# Patient Record
Sex: FEMALE | Race: WHITE | NOT HISPANIC OR LATINO | URBAN - METROPOLITAN AREA
[De-identification: names, ages, dates, MRNs, and addresses within clinical notes are randomized per-mention and may not be internally consistent; named-entity substitution may affect disease eponyms.]

---

## 2018-07-22 PROBLEM — 235856003 DISEASE OF LIVER: Status: ACTIVE | Noted: 2018-07-16

## 2018-11-08 PROBLEM — 197321007 FATTY LIVER: Status: ACTIVE | Noted: 2018-11-08

## 2019-03-27 PROBLEM — 16932000 NAUSEA AND VOMITING: Status: ACTIVE | Noted: 2019-03-27

## 2019-03-27 PROBLEM — 14760008: Status: ACTIVE | Noted: 2018-11-29

## 2019-04-04 PROBLEM — 10743008 IRRITABLE BOWEL SYNDROME: Status: ACTIVE | Noted: 2018-02-01

## 2019-04-04 PROBLEM — 57773001: Status: ACTIVE | Noted: 2018-11-30

## 2019-04-04 PROBLEM — 447072005: Status: ACTIVE | Noted: 2018-11-30

## 2019-04-04 PROBLEM — 721705006: Status: ACTIVE | Noted: 2019-04-04

## 2020-12-28 ENCOUNTER — OFFICE VISIT (OUTPATIENT)
Dept: URBAN - METROPOLITAN AREA CLINIC 23 | Facility: CLINIC | Age: 75
End: 2020-12-28
Payer: MEDICARE

## 2020-12-28 ENCOUNTER — WEB ENCOUNTER (OUTPATIENT)
Dept: URBAN - METROPOLITAN AREA CLINIC 23 | Facility: CLINIC | Age: 75
End: 2020-12-28

## 2020-12-28 VITALS
TEMPERATURE: 96.4 F | WEIGHT: 130 LBS | BODY MASS INDEX: 23.92 KG/M2 | HEART RATE: 74 BPM | DIASTOLIC BLOOD PRESSURE: 94 MMHG | HEIGHT: 62 IN | SYSTOLIC BLOOD PRESSURE: 147 MMHG

## 2020-12-28 DIAGNOSIS — K59.01 CONSTIPATION: ICD-10-CM

## 2020-12-28 DIAGNOSIS — Z12.11 SCREEN FOR COLON CANCER: ICD-10-CM

## 2020-12-28 DIAGNOSIS — K57.92 DIVERTICULITIS: ICD-10-CM

## 2020-12-28 DIAGNOSIS — K57.90 DIVERTICULOSIS: ICD-10-CM

## 2020-12-28 DIAGNOSIS — K58.9 IRRITABLE BOWEL SYNDROME: ICD-10-CM

## 2020-12-28 PROBLEM — 307496006 DIVERTICULITIS: Status: ACTIVE | Noted: 2020-12-28

## 2020-12-28 PROBLEM — 275978004 SCREENING FOR MALIGNANT NEOPLASM OF COLON: Status: ACTIVE | Noted: 2020-12-28

## 2020-12-28 PROCEDURE — G9903 PT SCRN TBCO ID AS NON USER: HCPCS | Performed by: INTERNAL MEDICINE

## 2020-12-28 PROCEDURE — 99203 OFFICE O/P NEW LOW 30 MIN: CPT | Performed by: INTERNAL MEDICINE

## 2020-12-28 PROCEDURE — G8427 DOCREV CUR MEDS BY ELIG CLIN: HCPCS | Performed by: INTERNAL MEDICINE

## 2020-12-28 PROCEDURE — 3017F COLORECTAL CA SCREEN DOC REV: CPT | Performed by: INTERNAL MEDICINE

## 2020-12-28 PROCEDURE — G8420 CALC BMI NORM PARAMETERS: HCPCS | Performed by: INTERNAL MEDICINE

## 2020-12-28 RX ORDER — LEVOTHYROXINE SODIUM 0.09 MG/1
TABLET ORAL
Qty: 0 | Refills: 0 | Status: ACTIVE | COMMUNITY
Start: 1900-01-01

## 2020-12-28 RX ORDER — CLONAZEPAM 0.5 MG/1
TABLET, ORALLY DISINTEGRATING ORAL
Qty: 0 | Refills: 0 | Status: ACTIVE | COMMUNITY
Start: 1900-01-01

## 2020-12-28 RX ORDER — BIFIDOBACTERIUM LONGUM 10MM CELL
CAPSULE ORAL
Qty: 0 | Refills: 0 | Status: ACTIVE | COMMUNITY
Start: 1900-01-01

## 2020-12-28 RX ORDER — FLUTICASONE PROPIONATE 50 UG/1
SPRAY, METERED NASAL
Qty: 0 | Refills: 0 | Status: ACTIVE | COMMUNITY
Start: 1900-01-01

## 2020-12-28 RX ORDER — CITALOPRAM HYDROBROMIDE 10 MG
TABLET ORAL
Qty: 0 | Refills: 0 | Status: ACTIVE | COMMUNITY
Start: 1900-01-01

## 2020-12-28 RX ORDER — ASPIRIN 81 MG/1
TABLET, COATED ORAL
Qty: 0 | Refills: 0 | Status: ACTIVE | COMMUNITY
Start: 1900-01-01

## 2020-12-28 RX ORDER — HYOSCYAMINE SULFATE 0.12 MG/1
TABLET ORAL
Qty: 0 | Refills: 0 | Status: ACTIVE | COMMUNITY
Start: 1900-01-01

## 2020-12-28 NOTE — HPI-TODAY'S VISIT:
75 yo female presenting to establish care -she was seen in 2015- had diverticulosis, had HP polyp.  she had an egd as well.   -she was having recurrent diverticulitis- was referred to see Dr. Lynn- had hemorrhoid surgery.  she had a colonoscopy 2 years ago- 5/2018- which had good prep, diverticulosis, and a diminutive polyp which was HP.  -she switched practices for a period of time to see Dr. Jackson and saw a third GI specialist.   she states that she did have an MRI -has tried linzess, amitiza.  currently using miralax daily - this gives her some amount of regularity -when she does get constipated she will have ongoing pain.  she states that sometimes she will have a lower abdominal pain and discomfort, feels 'like little ants are crawling around' -after some time she clearly states that there is no current issue she is seeking to address , she just wants to establish care 'so someone knows my medical history in case something bad happens' --states that she has only been treated once for diverticultius

## 2022-02-21 ENCOUNTER — OFFICE VISIT (OUTPATIENT)
Dept: URBAN - METROPOLITAN AREA CLINIC 33 | Facility: CLINIC | Age: 77
End: 2022-02-21
Payer: MEDICARE

## 2022-02-21 VITALS
DIASTOLIC BLOOD PRESSURE: 84 MMHG | HEART RATE: 74 BPM | SYSTOLIC BLOOD PRESSURE: 134 MMHG | OXYGEN SATURATION: 98 % | WEIGHT: 130 LBS | HEIGHT: 62 IN | BODY MASS INDEX: 23.92 KG/M2

## 2022-02-21 DIAGNOSIS — K59.01 CONSTIPATION: ICD-10-CM

## 2022-02-21 DIAGNOSIS — K57.90 DIVERTICULOSIS: ICD-10-CM

## 2022-02-21 PROCEDURE — 99214 OFFICE O/P EST MOD 30 MIN: CPT | Performed by: INTERNAL MEDICINE

## 2022-02-21 RX ORDER — ESCITALOPRAM OXALATE 20 MG/1
1 TABLET TABLET, FILM COATED ORAL ONCE A DAY
Status: ACTIVE | COMMUNITY

## 2022-02-21 RX ORDER — FLUTICASONE PROPIONATE 50 UG/1
SPRAY, METERED NASAL
Qty: 0 | Refills: 0 | Status: ACTIVE | COMMUNITY
Start: 1900-01-01

## 2022-02-21 RX ORDER — AMLODIPINE BESYLATE 2.5 MG
1 TABLET TABLET ORAL ONCE A DAY
Status: ACTIVE | COMMUNITY

## 2022-02-21 RX ORDER — LEVOTHYROXINE SODIUM 75 UG/1
1 TABLET IN THE MORNING ON AN EMPTY STOMACH TABLET ORAL ONCE A DAY
Refills: 0 | Status: ACTIVE | COMMUNITY
Start: 1900-01-01

## 2022-02-21 RX ORDER — CLONAZEPAM 0.25 MG/1
1 TABLET TABLET, ORALLY DISINTEGRATING ORAL ONCE A DAY
Refills: 0 | Status: ACTIVE | COMMUNITY
Start: 1900-01-01

## 2022-02-21 RX ORDER — DICYCLOMINE HYDROCHLORIDE 10 MG/1
1 CAPSULE CAPSULE ORAL THREE TIMES A DAY
Status: ACTIVE | COMMUNITY

## 2022-03-03 ENCOUNTER — TELEPHONE ENCOUNTER (OUTPATIENT)
Dept: URBAN - METROPOLITAN AREA CLINIC 36 | Facility: CLINIC | Age: 77
End: 2022-03-03

## 2022-03-03 RX ORDER — LINACLOTIDE 72 UG/1
1 CAPSULE AT LEAST 30 MINUTES BEFORE THE FIRST MEAL OF THE DAY ON AN EMPTY STOMACH CAPSULE, GELATIN COATED ORAL ONCE A DAY
Qty: 30 | Refills: 3 | OUTPATIENT
Start: 2022-03-03 | End: 2022-07-01

## 2022-05-23 ENCOUNTER — OFFICE VISIT (OUTPATIENT)
Dept: URBAN - METROPOLITAN AREA CLINIC 33 | Facility: CLINIC | Age: 77
End: 2022-05-23

## 2022-06-13 ENCOUNTER — OFFICE VISIT (OUTPATIENT)
Dept: URBAN - METROPOLITAN AREA CLINIC 33 | Facility: CLINIC | Age: 77
End: 2022-06-13
Payer: MEDICARE

## 2022-06-13 ENCOUNTER — LAB OUTSIDE AN ENCOUNTER (OUTPATIENT)
Dept: URBAN - METROPOLITAN AREA CLINIC 33 | Facility: CLINIC | Age: 77
End: 2022-06-13

## 2022-06-13 VITALS — BODY MASS INDEX: 23.37 KG/M2 | WEIGHT: 127 LBS | HEIGHT: 62 IN

## 2022-06-13 DIAGNOSIS — R11.2 NAUSEA AND VOMITING, UNSPECIFIED VOMITING TYPE: ICD-10-CM

## 2022-06-13 DIAGNOSIS — K57.30 DIVERTICULOSIS OF LARGE INTESTINE WITHOUT PERFORATION OR ABSCESS WITHOUT BLEEDING: ICD-10-CM

## 2022-06-13 DIAGNOSIS — K59.01 CONSTIPATION: ICD-10-CM

## 2022-06-13 DIAGNOSIS — R13.13 PHARYNGEAL DYSPHAGIA: ICD-10-CM

## 2022-06-13 DIAGNOSIS — K58.9 IRRITABLE BOWEL SYNDROME: ICD-10-CM

## 2022-06-13 PROBLEM — 14760008 CONSTIPATION: Status: ACTIVE | Noted: 2020-12-28

## 2022-06-13 PROBLEM — 10743008 IRRITABLE BOWEL SYNDROME: Status: ACTIVE | Noted: 2020-12-28

## 2022-06-13 PROCEDURE — 99214 OFFICE O/P EST MOD 30 MIN: CPT | Performed by: INTERNAL MEDICINE

## 2022-06-13 RX ORDER — CLONAZEPAM 0.25 MG/1
1 TABLET TABLET, ORALLY DISINTEGRATING ORAL ONCE A DAY
Refills: 0 | Status: ACTIVE | COMMUNITY
Start: 1900-01-01

## 2022-06-13 RX ORDER — LEVOTHYROXINE SODIUM 75 UG/1
1 TABLET IN THE MORNING ON AN EMPTY STOMACH TABLET ORAL ONCE A DAY
Refills: 0 | Status: ACTIVE | COMMUNITY
Start: 1900-01-01

## 2022-06-13 RX ORDER — FLUTICASONE PROPIONATE 50 UG/1
SPRAY, METERED NASAL
Qty: 0 | Refills: 0 | Status: ACTIVE | COMMUNITY
Start: 1900-01-01

## 2022-06-13 RX ORDER — DICYCLOMINE HYDROCHLORIDE 10 MG/1
1 CAPSULE CAPSULE ORAL THREE TIMES A DAY
Status: ACTIVE | COMMUNITY

## 2022-06-13 RX ORDER — ESCITALOPRAM OXALATE 20 MG/1
1 TABLET TABLET, FILM COATED ORAL ONCE A DAY
Status: ACTIVE | COMMUNITY

## 2022-06-13 RX ORDER — LINACLOTIDE 72 UG/1
1 CAPSULE AT LEAST 30 MINUTES BEFORE THE FIRST MEAL OF THE DAY ON AN EMPTY STOMACH CAPSULE, GELATIN COATED ORAL ONCE A DAY
Qty: 30 | Refills: 3 | Status: ACTIVE | COMMUNITY
Start: 2022-03-03 | End: 2022-07-01

## 2022-06-13 RX ORDER — AMLODIPINE BESYLATE 2.5 MG
1 TABLET TABLET ORAL ONCE A DAY
Status: ACTIVE | COMMUNITY

## 2022-06-13 NOTE — HPI-TODAY'S VISIT:
Patient presents for follow up. Pt states she tried Linzess for approximatley 2 weeks,which made her sick so she stopped . She is still having severe constipation . Patient states she has recently been having abdominal pain when trying to pass stool and it causea nausea and vomiting . She complains of symptoms ruing her Qualityof life .     Of last visit (02/21/22) Patient presents for IBS . She states she has long hx of IBS. She admits having constipation and rectal pain. She also admits abdominal pain when she needs to move bowels . She admits 2 bowel movements a day with slow transit and she sits in bathroom for 45 min at a time . She states she has hx of diverticulosis and the diet is conflicting with her constipation diet . She admits her last colonoscopy was completed 10/2021 by DR Matilde Lynn with significant diverticulosis and hyperplastic polyp .

## 2022-06-22 ENCOUNTER — TELEPHONE ENCOUNTER (OUTPATIENT)
Dept: URBAN - METROPOLITAN AREA CLINIC 36 | Facility: CLINIC | Age: 77
End: 2022-06-22

## 2022-07-06 ENCOUNTER — TELEPHONE ENCOUNTER (OUTPATIENT)
Dept: URBAN - METROPOLITAN AREA CLINIC 36 | Facility: CLINIC | Age: 77
End: 2022-07-06

## 2022-07-06 ENCOUNTER — OFFICE VISIT (OUTPATIENT)
Dept: URBAN - METROPOLITAN AREA CLINIC 33 | Facility: CLINIC | Age: 77
End: 2022-07-06

## 2022-07-06 RX ORDER — FLUTICASONE PROPIONATE 50 UG/1
SPRAY, METERED NASAL
Qty: 0 | Refills: 0 | Status: ACTIVE | COMMUNITY
Start: 1900-01-01

## 2022-07-06 RX ORDER — DICYCLOMINE HYDROCHLORIDE 10 MG/1
1 CAPSULE CAPSULE ORAL THREE TIMES A DAY
Status: ACTIVE | COMMUNITY

## 2022-07-06 RX ORDER — LEVOTHYROXINE SODIUM 75 UG/1
1 TABLET IN THE MORNING ON AN EMPTY STOMACH TABLET ORAL ONCE A DAY
Refills: 0 | Status: ACTIVE | COMMUNITY
Start: 1900-01-01

## 2022-07-06 RX ORDER — ESCITALOPRAM OXALATE 20 MG/1
1 TABLET TABLET, FILM COATED ORAL ONCE A DAY
Status: ACTIVE | COMMUNITY

## 2022-07-06 RX ORDER — AMLODIPINE BESYLATE 2.5 MG
1 TABLET TABLET ORAL ONCE A DAY
Status: ACTIVE | COMMUNITY

## 2022-07-06 RX ORDER — CLONAZEPAM 0.25 MG/1
1 TABLET TABLET, ORALLY DISINTEGRATING ORAL ONCE A DAY
Refills: 0 | Status: ACTIVE | COMMUNITY
Start: 1900-01-01

## 2022-07-20 ENCOUNTER — OFFICE VISIT (OUTPATIENT)
Dept: URBAN - METROPOLITAN AREA CLINIC 33 | Facility: CLINIC | Age: 77
End: 2022-07-20
Payer: MEDICARE

## 2022-07-20 ENCOUNTER — LAB OUTSIDE AN ENCOUNTER (OUTPATIENT)
Dept: URBAN - METROPOLITAN AREA CLINIC 33 | Facility: CLINIC | Age: 77
End: 2022-07-20

## 2022-07-20 VITALS
HEIGHT: 62 IN | HEART RATE: 74 BPM | DIASTOLIC BLOOD PRESSURE: 84 MMHG | OXYGEN SATURATION: 98 % | SYSTOLIC BLOOD PRESSURE: 132 MMHG | BODY MASS INDEX: 23.37 KG/M2 | WEIGHT: 127 LBS

## 2022-07-20 DIAGNOSIS — R13.13 PHARYNGEAL DYSPHAGIA: ICD-10-CM

## 2022-07-20 DIAGNOSIS — R11.2 NAUSEA AND VOMITING, UNSPECIFIED VOMITING TYPE: ICD-10-CM

## 2022-07-20 DIAGNOSIS — R16.0 HEPATOMEGALY: ICD-10-CM

## 2022-07-20 DIAGNOSIS — K58.9 IBS (IRRITABLE BOWEL SYNDROME): ICD-10-CM

## 2022-07-20 DIAGNOSIS — K57.90 DIVERTICULOSIS: ICD-10-CM

## 2022-07-20 PROBLEM — 16932000: Status: ACTIVE | Noted: 2022-06-13

## 2022-07-20 PROBLEM — 21101000119105: Status: ACTIVE | Noted: 2022-06-13

## 2022-07-20 PROCEDURE — 99213 OFFICE O/P EST LOW 20 MIN: CPT | Performed by: INTERNAL MEDICINE

## 2022-07-20 RX ORDER — DICYCLOMINE HYDROCHLORIDE 10 MG/1
1 CAPSULE CAPSULE ORAL THREE TIMES A DAY
Status: ACTIVE | COMMUNITY

## 2022-07-20 RX ORDER — ESCITALOPRAM OXALATE 20 MG/1
1 TABLET TABLET, FILM COATED ORAL ONCE A DAY
Status: ACTIVE | COMMUNITY

## 2022-07-20 RX ORDER — FLUTICASONE PROPIONATE 50 UG/1
SPRAY, METERED NASAL
Qty: 0 | Refills: 0 | Status: ACTIVE | COMMUNITY
Start: 1900-01-01

## 2022-07-20 RX ORDER — LEVOTHYROXINE SODIUM 75 UG/1
1 TABLET IN THE MORNING ON AN EMPTY STOMACH TABLET ORAL ONCE A DAY
Refills: 0 | Status: ACTIVE | COMMUNITY
Start: 1900-01-01

## 2022-07-20 RX ORDER — AMLODIPINE BESYLATE 2.5 MG
1 TABLET TABLET ORAL ONCE A DAY
Status: ACTIVE | COMMUNITY

## 2022-07-20 RX ORDER — CLONAZEPAM 0.25 MG/1
1 TABLET TABLET, ORALLY DISINTEGRATING ORAL ONCE A DAY
Refills: 0 | Status: ACTIVE | COMMUNITY
Start: 1900-01-01

## 2022-07-20 NOTE — HPI-TODAY'S VISIT:
Patient presents for follow up . She had abdominal xray which resulted normal . She admits having THD hemorrhoid surgery wikth Dr Lynn at the end of June. Patient admits taking miralax but was recommended by surgeon to take citrucel . She admit she is still having hard time passing stool .    Of last visit (06/13/2022) Patient presents for follow up. Pt states she tried Linzess for approximatley 2 weeks,which made her sick so she stopped . She is still having severe constipation . Patient states she has recently been having abdominal pain when trying to pass stool and it causea nausea and vomiting . She complains of symptoms ruing her Qualityof life .     Of last visit (02/21/22) Patient presents for IBS . She states she has long hx of IBS. She admits having constipation and rectal pain. She also admits abdominal pain when she needs to move bowels . She admits 2 bowel movements a day with slow transit and she sits in bathroom for 45 min at a time . She states she has hx of diverticulosis and the diet is conflicting with her constipation diet . She admits her last colonoscopy was completed 10/2021 by DR Matilde Lynn with significant diverticulosis and hyperplastic polyp .

## 2022-07-27 ENCOUNTER — TELEPHONE ENCOUNTER (OUTPATIENT)
Dept: URBAN - METROPOLITAN AREA CLINIC 36 | Facility: CLINIC | Age: 77
End: 2022-07-27

## 2022-08-03 ENCOUNTER — OFFICE VISIT (OUTPATIENT)
Dept: URBAN - METROPOLITAN AREA CLINIC 33 | Facility: CLINIC | Age: 77
End: 2022-08-03
Payer: MEDICARE

## 2022-08-03 ENCOUNTER — DASHBOARD ENCOUNTERS (OUTPATIENT)
Age: 77
End: 2022-08-03

## 2022-08-03 VITALS — HEIGHT: 62 IN | WEIGHT: 126 LBS | BODY MASS INDEX: 23.19 KG/M2

## 2022-08-03 DIAGNOSIS — K82.4 GALLBLADDER POLYP: ICD-10-CM

## 2022-08-03 DIAGNOSIS — K76.0 FATTY LIVER: ICD-10-CM

## 2022-08-03 DIAGNOSIS — K57.30 DIVERTICULOSIS OF LARGE INTESTINE WITHOUT PERFORATION OR ABSCESS WITHOUT BLEEDING: ICD-10-CM

## 2022-08-03 DIAGNOSIS — K57.90 DIVERTICULOSIS: ICD-10-CM

## 2022-08-03 DIAGNOSIS — R16.0 HEPATOMEGALY: ICD-10-CM

## 2022-08-03 DIAGNOSIS — K58.9 IBS (IRRITABLE BOWEL SYNDROME): ICD-10-CM

## 2022-08-03 PROBLEM — 197321007: Status: ACTIVE | Noted: 2022-08-03

## 2022-08-03 PROBLEM — 197433003: Status: ACTIVE | Noted: 2022-08-03

## 2022-08-03 PROBLEM — 80515008: Status: ACTIVE | Noted: 2022-07-20

## 2022-08-03 PROBLEM — 398050005 DIVERTICULAR DISEASE OF COLON: Status: ACTIVE | Noted: 2018-02-01

## 2022-08-03 PROCEDURE — 99213 OFFICE O/P EST LOW 20 MIN: CPT | Performed by: INTERNAL MEDICINE

## 2022-08-03 RX ORDER — ESCITALOPRAM OXALATE 20 MG/1
1 TABLET TABLET, FILM COATED ORAL ONCE A DAY
Status: ACTIVE | COMMUNITY

## 2022-08-03 RX ORDER — DICYCLOMINE HYDROCHLORIDE 10 MG/1
1 CAPSULE CAPSULE ORAL THREE TIMES A DAY
Status: ACTIVE | COMMUNITY

## 2022-08-03 RX ORDER — AMLODIPINE BESYLATE 2.5 MG
1 TABLET TABLET ORAL ONCE A DAY
Status: ACTIVE | COMMUNITY

## 2022-08-03 RX ORDER — CLONAZEPAM 0.25 MG/1
1 TABLET TABLET, ORALLY DISINTEGRATING ORAL ONCE A DAY
Refills: 0 | Status: ACTIVE | COMMUNITY
Start: 1900-01-01

## 2022-08-03 RX ORDER — LEVOTHYROXINE SODIUM 75 UG/1
1 TABLET IN THE MORNING ON AN EMPTY STOMACH TABLET ORAL ONCE A DAY
Refills: 0 | Status: ACTIVE | COMMUNITY
Start: 1900-01-01

## 2022-08-03 RX ORDER — FLUTICASONE PROPIONATE 50 UG/1
SPRAY, METERED NASAL
Qty: 0 | Refills: 0 | Status: ACTIVE | COMMUNITY
Start: 1900-01-01

## 2022-08-03 NOTE — HPI-TODAY'S VISIT:
Patient presents for follow up . Patient had abdominal u/s completed since last visit .  She admits her pain has improved . Patient admits bowel habits have improved and retal pain has subsided . She denes any nausea or vomiting .   Patient admits 2 bowel movements a day with normal stools. She admits sometimes she has to sit for long period of time. She denies taking Miralax or any other supplements at this time .     Of last visit (07/20/2022) Patient presents for follow up . She had abdominal xray which resulted normal . She admits having THD hemorrhoid surgery wikth Dr Lynn at the end of June. Patient admits taking miralax but was recommended by surgeon to take citrucel . She admit she is still having hard time passing stool .    Of last visit (06/13/2022) Patient presents for follow up. Pt states she tried Linzess for approximatley 2 weeks,which made her sick so she stopped . She is still having severe constipation . Patient states she has recently been having abdominal pain when trying to pass stool and it causea nausea and vomiting . She complains of symptoms ruing her Qualityof life .     Of last visit (02/21/22) Patient presents for IBS . She states she has long hx of IBS. She admits having constipation and rectal pain. She also admits abdominal pain when she needs to move bowels . She admits 2 bowel movements a day with slow transit and she sits in bathroom for 45 min at a time . She states she has hx of diverticulosis and the diet is conflicting with her constipation diet . She admits her last colonoscopy was completed 10/2021 by DR Matilde Lynn with significant diverticulosis and hyperplastic polyp .

## 2023-01-01 NOTE — HPI-TODAY'S VISIT:
Patient presents for IBS . She states she has long hx of IBS. She admits having constipation and rectal pain. She also admits abdominal pain when she needs to move bowels . She admits 2 bowel movements a day with slow transit and she sits in bathroom for 45 min at a time . She states she has hx of diverticulosis and the diet is conflicting with her constipation diet . She admits her last colonoscopy was completed 10/2021 by DR Matilde Lynn with significant diverticulosis and hyperplastic polyp .
Statement Selected